# Patient Record
Sex: FEMALE | Race: WHITE | NOT HISPANIC OR LATINO
[De-identification: names, ages, dates, MRNs, and addresses within clinical notes are randomized per-mention and may not be internally consistent; named-entity substitution may affect disease eponyms.]

---

## 2020-07-24 ENCOUNTER — TRANSCRIPTION ENCOUNTER (OUTPATIENT)
Age: 55
End: 2020-07-24

## 2021-04-13 ENCOUNTER — APPOINTMENT (OUTPATIENT)
Dept: OBGYN | Facility: CLINIC | Age: 56
End: 2021-04-13
Payer: COMMERCIAL

## 2021-04-13 VITALS
HEIGHT: 59 IN | BODY MASS INDEX: 24.8 KG/M2 | WEIGHT: 123 LBS | SYSTOLIC BLOOD PRESSURE: 110 MMHG | DIASTOLIC BLOOD PRESSURE: 70 MMHG

## 2021-04-13 DIAGNOSIS — Z01.419 ENCOUNTER FOR GYNECOLOGICAL EXAMINATION (GENERAL) (ROUTINE) W/OUT ABNORMAL FINDINGS: ICD-10-CM

## 2021-04-13 PROCEDURE — 99072 ADDL SUPL MATRL&STAF TM PHE: CPT

## 2021-04-13 PROCEDURE — 99386 PREV VISIT NEW AGE 40-64: CPT

## 2021-04-13 PROCEDURE — 82270 OCCULT BLOOD FECES: CPT

## 2021-04-13 NOTE — HISTORY OF PRESENT ILLNESS
[FreeTextEntry1] : 54 yo white female para 4 presents as a new patient after a multiyear absence. She is sexually active with her  reports no issues. She is postmenopausal and denies significant vasomotor symptoms.\par \par She is a medical history of asthma. Surgical history of appendectomy. OB history significant for 4 vaginal deliveries and a miscarriage. She is . She denies alcohol tobacco drug use.\par \par She does have a family history of breast cancer in both her grandmothers and an aunt. She has not had a mammogram in the last 5 years. She has not had a bone density. She is not had a colonoscopy.\par \par Patient works for [email or web address]. Her children are 25, 23, 18 and 13.

## 2021-04-13 NOTE — DISCUSSION/SUMMARY
[FreeTextEntry1] : A Pap smear is performed. Prescription for mammography and DEXA scan were provided. I encouraged her to consider a colonoscopy. I also advised her to followup with her PCP to get her annual blood work.

## 2021-04-13 NOTE — PHYSICAL EXAM
[Appropriately responsive] : appropriately responsive [Alert] : alert [No Acute Distress] : no acute distress [No Lymphadenopathy] : no lymphadenopathy [Regular Rate Rhythm] : regular rate rhythm [No Murmurs] : no murmurs [Clear to Auscultation B/L] : clear to auscultation bilaterally [Soft] : soft [Non-tender] : non-tender [Non-distended] : non-distended [No HSM] : No HSM [No Lesions] : no lesions [No Mass] : no mass [Oriented x3] : oriented x3 [Examination Of The Breasts] : a normal appearance [No Masses] : no breast masses were palpable [Labia Majora] : normal [Labia Minora] : normal [Normal] : normal [Uterine Adnexae] : normal [No Tenderness] : no tenderness [Nl Sphincter Tone] : normal sphincter tone [FreeTextEntry9] : matthieu neg

## 2021-04-15 LAB — HPV HIGH+LOW RISK DNA PNL CVX: NOT DETECTED

## 2021-04-19 LAB — CYTOLOGY CVX/VAG DOC THIN PREP: ABNORMAL

## 2021-04-27 ENCOUNTER — TRANSCRIPTION ENCOUNTER (OUTPATIENT)
Age: 56
End: 2021-04-27

## 2022-11-21 ENCOUNTER — NON-APPOINTMENT (OUTPATIENT)
Age: 57
End: 2022-11-21

## 2022-11-21 ENCOUNTER — APPOINTMENT (OUTPATIENT)
Dept: OBGYN | Facility: CLINIC | Age: 57
End: 2022-11-21

## 2022-11-21 VITALS
DIASTOLIC BLOOD PRESSURE: 20 MMHG | SYSTOLIC BLOOD PRESSURE: 117 MMHG | HEIGHT: 59 IN | BODY MASS INDEX: 23.99 KG/M2 | WEIGHT: 119 LBS

## 2022-11-21 DIAGNOSIS — R92.2 INCONCLUSIVE MAMMOGRAM: ICD-10-CM

## 2022-11-21 DIAGNOSIS — Z12.31 ENCOUNTER FOR SCREENING MAMMOGRAM FOR MALIGNANT NEOPLASM OF BREAST: ICD-10-CM

## 2022-11-21 PROCEDURE — 99396 PREV VISIT EST AGE 40-64: CPT

## 2022-11-21 NOTE — DISCUSSION/SUMMARY
[FreeTextEntry1] : Well woman exam\par \par pap done\par mammo/breast sono ordered\par bone density -utd\par recommended taking vit. D 2000 units daily and through diet obtain 1200 mg of calcium along with 2.5 hours of weight bearing exercise per week\par return in 1 year\par encouraged colonoscopy\par genetic testing discussed

## 2022-11-21 NOTE — HISTORY OF PRESENT ILLNESS
[FreeTextEntry1] : 58yo white female para 4 here for av. SHe has no gyn complaints today.She is sexually active with her  reports no issues. She is postmenopausal and denies significant vasomotor symptoms.\par \par She is a medical history of asthma. Surgical history of appendectomy. OB history significant for 4 vaginal deliveries and a miscarriage. She is . She denies alcohol tobacco drug use.\par \par She does have a family history of breast cancer in both her grandmothers and an aunt. The aunt was in her 30's. She had a bone density again this summer, has osteoporosis and is being treated with alendronate-see DR. Elkins  She has not had a colonoscopy.\par \par Patient works for [employer]. Her children are 26, 24, 19 and 14. [Mammogramdate] : 4/2021 [PapSmeardate] : 4/2021 [BoneDensityDate] : 6/2022

## 2022-12-06 LAB
CYTOLOGY CVX/VAG DOC THIN PREP: ABNORMAL
HPV HIGH+LOW RISK DNA PNL CVX: NOT DETECTED

## 2024-04-03 ENCOUNTER — RESULT REVIEW (OUTPATIENT)
Age: 59
End: 2024-04-03

## 2024-04-03 ENCOUNTER — APPOINTMENT (OUTPATIENT)
Dept: OBGYN | Facility: CLINIC | Age: 59
End: 2024-04-03
Payer: COMMERCIAL

## 2024-04-03 DIAGNOSIS — Z01.419 ENCOUNTER FOR GYNECOLOGICAL EXAMINATION (GENERAL) (ROUTINE) W/OUT ABNORMAL FINDINGS: ICD-10-CM

## 2024-04-03 PROCEDURE — 99386 PREV VISIT NEW AGE 40-64: CPT

## 2024-04-03 NOTE — DISCUSSION/SUMMARY
[FreeTextEntry1] : A Pap smear is performed. Prescription for mammography was provided. I encouraged her to consider a colonoscopy.   Regarding her family history of breast cancer she will return for genetic testing.

## 2024-04-03 NOTE — HISTORY OF PRESENT ILLNESS
[FreeTextEntry1] : 57 yo white female para 4 presents as a new patient after a multiyear absence. She is sexually active with her  reports no issues. She is postmenopausal and denies significant vasomotor symptoms. She has a small amount of vaginal dryness.  Patient also has osteoporosis that is followed by rheumatology and she takes weekly Fosamax.  She is a medical history of asthma. Surgical history of appendectomy. OB history significant for 4 vaginal deliveries and a miscarriage. She is . She denies alcohol tobacco drug use.  She does have a family history of breast cancer in both her grandmothers and an aunt. She has not had a mammogram isince 2021.   She is not had a colonoscopy.  Patient works for [employer]. Her children are 25, 23, 18 and 13.

## 2024-04-04 LAB — HPV HIGH+LOW RISK DNA PNL CVX: NOT DETECTED

## 2024-04-08 LAB — CYTOLOGY CVX/VAG DOC THIN PREP: ABNORMAL

## 2024-06-03 ENCOUNTER — APPOINTMENT (OUTPATIENT)
Dept: OBGYN | Facility: CLINIC | Age: 59
End: 2024-06-03